# Patient Record
Sex: FEMALE | Race: WHITE
[De-identification: names, ages, dates, MRNs, and addresses within clinical notes are randomized per-mention and may not be internally consistent; named-entity substitution may affect disease eponyms.]

---

## 2019-12-11 ENCOUNTER — HOSPITAL ENCOUNTER (INPATIENT)
Dept: HOSPITAL 92 - L&D | Age: 34
LOS: 2 days | Discharge: HOME | End: 2019-12-13
Attending: OBSTETRICS & GYNECOLOGY | Admitting: OBSTETRICS & GYNECOLOGY
Payer: COMMERCIAL

## 2019-12-11 VITALS — BODY MASS INDEX: 33.1 KG/M2

## 2019-12-11 DIAGNOSIS — O34.211: Primary | ICD-10-CM

## 2019-12-11 DIAGNOSIS — Z3A.39: ICD-10-CM

## 2019-12-11 LAB
HBSAG INDEX: 0.2 S/CO (ref 0–0.99)
HGB BLD-MCNC: 13.2 G/DL (ref 12–16)
MCH RBC QN AUTO: 28.7 PG (ref 27–31)
MCV RBC AUTO: 81.9 FL (ref 78–98)
PLATELET # BLD AUTO: 241 THOU/UL (ref 130–400)
RBC # BLD AUTO: 4.6 MILL/UL (ref 4.2–5.4)
SYPHILIS ANTIBODY INDEX: 0.05 S/CO
WBC # BLD AUTO: 10.8 THOU/UL (ref 4.8–10.8)

## 2019-12-11 PROCEDURE — 86901 BLOOD TYPING SEROLOGIC RH(D): CPT

## 2019-12-11 PROCEDURE — 87340 HEPATITIS B SURFACE AG IA: CPT

## 2019-12-11 PROCEDURE — 86900 BLOOD TYPING SEROLOGIC ABO: CPT

## 2019-12-11 PROCEDURE — 86780 TREPONEMA PALLIDUM: CPT

## 2019-12-11 PROCEDURE — 85027 COMPLETE CBC AUTOMATED: CPT

## 2019-12-11 PROCEDURE — 86850 RBC ANTIBODY SCREEN: CPT

## 2019-12-11 PROCEDURE — 51702 INSERT TEMP BLADDER CATH: CPT

## 2019-12-11 PROCEDURE — 36415 COLL VENOUS BLD VENIPUNCTURE: CPT

## 2019-12-11 RX ADMIN — HYDROCODONE BITARTRATE AND ACETAMINOPHEN PRN TAB: 5; 325 TABLET ORAL at 14:17

## 2019-12-11 RX ADMIN — DOCUSATE CALCIUM SCH MG: 240 CAPSULE, LIQUID FILLED ORAL at 21:18

## 2019-12-11 NOTE — PDOC.OPDEL
OB Operative/Delivery Note


Delivery Dr/Surgeon: Skip


Pre-Delivery Diagnosis: scheduled  section (RCS, 2 previous)


Procedure/Post Delivery Dx: repeat low transverse CS


Weeks gestation: 39





- Findings


  ** A


Sex: male


Weight: 8 lb 13 oz


Apgar - 1 min: 8


Apgar - 5 min: 8





- Additional Findings/Plan


Placenta delivered: manual removal


 findings: low transverse hysterotomy without extension, normal uterus, 

normal tubes, normal ovaries


Estimated blood loss: 350ml,  QBL pending


Compilations/Other Findings: 





none


Post delivery plan: routine recovery

## 2019-12-11 NOTE — OP
DATE OF PROCEDURE:  2019



ADMISSION DIAGNOSES:  

1. Previous  section x2.

2. 39 weeks.

3. Gestational diabetes, diet controlled.



POSTOPERATIVE DIAGNOSES:  

1. Previous  section x2.

2. 39 weeks.

3. Gestational diabetes, diet controlled.



PROCEDURE PERFORMED:  Repeat low-transverse  section.



ASSISTANT:  Stefanie Parrish PA-C.



COMPLICATIONS:  None.



ESTIMATED BLOOD LOSS:  350 mL.



QUANTITATIVE BLOOD LOSS:  Pending at the time of dictation.



ANESTHESIA:  Spinal per Dr. De Leon.



OPERATIVE FINDINGS:  

1. Low-transverse hysterotomy without extension.

2. No adhesive disease.

3. Vigorous female infant, Apgars 8 and 8, weight 8 pounds 13 ounces to 

Nursery. 

4. Normal-appearing uterus, tubes, and ovaries bilaterally.

5. Fundus firm after delivery of placenta.

6. Surgical site hemostatic.



PROCEDURE IN DETAIL:  The patient was taken back to the OR with IV fluids running.

Once she was in the OR, spinal anesthesia was obtained and the patient was placed in

dorsal supine position with a left lateral tilt.  The Lopez catheter was placed

using sterile technique and the abdomen was prepped and draped in normal fashion for

 section.  Surgeons were gowned and gloved.  The anesthesia was tested after

the abdominal drape was placed and was found to be adequate.  A Pfannenstiel skin

incision was made with a scalpel.  The skin incision was carried down through the

subcutaneous tissue to the fascia.  Once the fascia was reached, it was incised in

the midline and extended laterally using curved Hammond scissors.  Kocher clamps were

placed at the superior border of the fascia, which was sharply and bluntly dissected

off the rectus abdominis muscles in both caudad and cephalad directions, allowing

for adequate space for delivery of the infant.  The peritoneum and rectus muscles

were tented off the abdomen and a scalpel was used to incise this tissue in the

midline.  The peritoneum was then bluntly entered and stretched laterally.  An

Guillermo O retractor was placed into the abdominoperitoneal cavity for retraction,

visualization, and protection of the wound.  A bladder flap was created and the

bladder reflection was dissected away from the planned hysterotomy site.  The

hysterotomy was performed in a low transverse fashion.  The hysterotomy was bluntly

entered and stretched using the Ramirez maneuver.  An Allis clamp was used to rupture

the membranes with clear fluid noted.  The infant was then delivered without

difficulty through the hysterotomy.  The nose and mouth were suctioned.  The cord

was doubly clamped and cut.  The infant was handed off to Special Care Nurse in

attendance.  Cord blood was collected.  The placenta was delivered.  The uterus was

exteriorized, massaged to firm, and cleared off clot and debris.  Then returned to

the abdominal cavity.  The hysterotomy was inspected and no extensions were noted.

The hysterotomy was then closed in a running locked fashion using Monocryl suture.

After the closure was complete, the hysterotomy was inspected and no bleeding was

noted.  The hysterotomy and paracolic gutters were irrigated and suctioned dry.  The

hysterotomy was again inspected and noted to be hemostatic.  The fundus was firm.

The count was correct.  The rectus fascia and muscle were inspected and any small

areas of bleeding were controlled with Bovie cauterization.  The fascia was

reapproximated with PDS suture from corner to corner in a running fashion.  The

subcutaneous tissue was irrigated and dried.  Any small areas of bleeding were

controlled with Bovie cauterization.  Subcutaneous tissue was reapproximated with

plain gut suture and the skin was closed with 4-0 Monocryl and dressed with

Dermabond.  The patient tolerated the procedure well.  There were no complications.

The final count was correct. 





Job ID:  [id number]

## 2019-12-12 LAB
HGB BLD-MCNC: 10.8 G/DL (ref 12–16)
MCH RBC QN AUTO: 29 PG (ref 27–31)
MCV RBC AUTO: 83.3 FL (ref 78–98)
PLATELET # BLD AUTO: 176 THOU/UL (ref 130–400)
RBC # BLD AUTO: 3.74 MILL/UL (ref 4.2–5.4)
WBC # BLD AUTO: 9.3 THOU/UL (ref 4.8–10.8)

## 2019-12-12 RX ADMIN — HYDROCODONE BITARTRATE AND ACETAMINOPHEN PRN TAB: 5; 325 TABLET ORAL at 01:46

## 2019-12-12 RX ADMIN — HYDROCODONE BITARTRATE AND ACETAMINOPHEN PRN TAB: 5; 325 TABLET ORAL at 15:14

## 2019-12-12 RX ADMIN — DOCUSATE CALCIUM SCH MG: 240 CAPSULE, LIQUID FILLED ORAL at 21:29

## 2019-12-12 RX ADMIN — HYDROCODONE BITARTRATE AND ACETAMINOPHEN PRN TAB: 5; 325 TABLET ORAL at 19:29

## 2019-12-12 RX ADMIN — HYDROCODONE BITARTRATE AND ACETAMINOPHEN PRN TAB: 5; 325 TABLET ORAL at 10:27

## 2019-12-12 RX ADMIN — DOCUSATE CALCIUM SCH MG: 240 CAPSULE, LIQUID FILLED ORAL at 10:20

## 2019-12-12 RX ADMIN — SIMETHICONE PRN MG: 80 TABLET, CHEWABLE ORAL at 01:46

## 2019-12-12 RX ADMIN — SIMETHICONE PRN MG: 80 TABLET, CHEWABLE ORAL at 15:14

## 2019-12-12 NOTE — PDOC.PP
Post Partum Progress Note


Post Partum Day #: 1


Subjective: 





doing well, no concerns, pain controlled


PO intake tolerated: yes


Flatus: yes


Ambulation: yes


 Vital Signs (12 hours)











  Temp Pulse Resp BP Pulse Ox


 


 12/12/19 11:52  98.7 F  113 H  20  117/62 


 


 12/12/19 08:32  98.0 F  96  20  107/60  98


 


 12/12/19 04:00  98.2 F  89  16  113/54 L  95








 Weight











Weight                         199 lb

















- Physical Examination


General: NAD


Respiratory: non-labored breathing


Abdominal: no distention


Fundus firm & at: below umb


Skin: CS incision dry & intact (dressing dry)


Neurological: no gross focal deficits


Psychiatric: A&Ox3, normal affect


Result Diagrams: 


 12/12/19 05:42





Additional Labs: 


 Post Partum Labs











Blood Type  O POSITIVE   12/11/19  07:09    


 


Hep Bs Antigen  Non-Reactive S/CO (NonReactive)   12/11/19  06:11    














- Assessment/Plan





POD1, plan for catheter removal when pt has more sensation to it (hx of urinary 

retention x 2 CS).  Doing well.

## 2019-12-13 VITALS — SYSTOLIC BLOOD PRESSURE: 131 MMHG | TEMPERATURE: 97.9 F | DIASTOLIC BLOOD PRESSURE: 75 MMHG

## 2019-12-13 RX ADMIN — DOCUSATE CALCIUM SCH MG: 240 CAPSULE, LIQUID FILLED ORAL at 08:50

## 2019-12-13 RX ADMIN — HYDROCODONE BITARTRATE AND ACETAMINOPHEN PRN TAB: 5; 325 TABLET ORAL at 00:49

## 2019-12-13 RX ADMIN — HYDROCODONE BITARTRATE AND ACETAMINOPHEN PRN TAB: 5; 325 TABLET ORAL at 05:06

## 2019-12-13 RX ADMIN — HYDROCODONE BITARTRATE AND ACETAMINOPHEN PRN TAB: 5; 325 TABLET ORAL at 13:04

## 2019-12-13 RX ADMIN — SIMETHICONE PRN MG: 80 TABLET, CHEWABLE ORAL at 00:48

## 2019-12-13 NOTE — PDOC.PP
Post Partum Progress Note


Post Partum Day #: 2


Subjective: 





doing well, desires DC home today


PO intake tolerated: yes


Flatus: yes


Ambulation: yes


 Vital Signs (12 hours)











  Temp Pulse Resp BP Pulse Ox


 


 19 07:32  97.9 F  95  16  131/75  100


 


 19 04:00  98 F  86  16  112/68  97


 


 19 00:00  98.3 F  94  16  117/70  98








 Weight











Weight                         199 lb

















- Physical Examination


General: NAD


Respiratory: clear to auscultation bilaterally


Abdominal: no distention


Skin: CS incision dry & intact, no rash


Psychiatric: A&Ox3, normal affect


Result Diagrams: 


 19 05:42





Additional Labs: 


 Post Partum Labs











Blood Type  O POSITIVE   19  07:09    


 


Hep Bs Antigen  Non-Reactive S/CO (NonReactive)   19  06:11    











(1)  delivery delivered


Code(s): O82 - ENCOUNTER FOR  DELIVERY WITHOUT INDICATION   Status: 

Acute   





- Assessment/Plan





POD2 doing well, plan for DC home today.